# Patient Record
Sex: FEMALE | Race: WHITE | URBAN - METROPOLITAN AREA
[De-identification: names, ages, dates, MRNs, and addresses within clinical notes are randomized per-mention and may not be internally consistent; named-entity substitution may affect disease eponyms.]

---

## 2017-02-20 ENCOUNTER — HOSPITAL ENCOUNTER (OUTPATIENT)
Dept: GENERAL RADIOLOGY | Facility: CLINIC | Age: 42
Discharge: HOME | End: 2017-02-20
Attending: INTERNAL MEDICINE | Admitting: INTERNAL MEDICINE
Payer: COMMERCIAL

## 2017-02-20 DIAGNOSIS — K92.1 HEMATOCHEZIA: ICD-10-CM

## 2017-02-20 DIAGNOSIS — K59.00 CONSTIPATION, UNSPECIFIED CONSTIPATION TYPE: ICD-10-CM

## 2017-02-20 DIAGNOSIS — Z71.3 DIETARY COUNSELING: ICD-10-CM

## 2017-02-20 PROCEDURE — 74000 XR ABDOMEN 1 VW: CPT

## 2017-03-31 ASSESSMENT — MIFFLIN-ST. JEOR: SCORE: 1501.83

## 2017-04-01 ENCOUNTER — ANESTHESIA - HEALTHEAST (OUTPATIENT)
Dept: SURGERY | Facility: HOSPITAL | Age: 42
End: 2017-04-01

## 2017-04-03 ENCOUNTER — SURGERY - HEALTHEAST (OUTPATIENT)
Dept: SURGERY | Facility: HOSPITAL | Age: 42
End: 2017-04-03

## 2017-04-10 ENCOUNTER — TRANSFERRED RECORDS (OUTPATIENT)
Dept: HEALTH INFORMATION MANAGEMENT | Facility: CLINIC | Age: 42
End: 2017-04-10

## 2017-04-11 ENCOUNTER — APPOINTMENT (OUTPATIENT)
Dept: VASCULAR SURGERY | Facility: CLINIC | Age: 42
End: 2017-04-11
Payer: COMMERCIAL

## 2017-04-11 PROCEDURE — 99207 ZZC NO CHARGE LOS: CPT | Performed by: SURGERY

## 2017-04-11 PROCEDURE — 36475 ENDOVENOUS RF 1ST VEIN: CPT | Mod: LT | Performed by: SURGERY

## 2017-04-11 PROCEDURE — 36476 ENDOVENOUS RF VEIN ADD-ON: CPT | Mod: LT | Performed by: SURGERY

## 2017-04-11 PROCEDURE — 37766 PHLEB VEINS - EXTREM 20+: CPT | Mod: LT | Performed by: SURGERY

## 2017-04-12 ENCOUNTER — TELEPHONE (OUTPATIENT)
Dept: OTHER | Facility: CLINIC | Age: 42
End: 2017-04-12

## 2017-04-12 NOTE — TELEPHONE ENCOUNTER
I called Jennifer Crowe Today after [facility]  Yesterday.  She is very comfortable and having no discomfort.  I told her she may remove her  Dressings and shower tonight.  She should reapply the Ace bandage overnight.  She has an appointment to see us in follow-up tomorrow.  She had no further questions or concerns.    Jonathon Phoenix MD

## 2017-04-13 ENCOUNTER — APPOINTMENT (OUTPATIENT)
Dept: VASCULAR SURGERY | Facility: CLINIC | Age: 42
End: 2017-04-13
Payer: COMMERCIAL

## 2017-04-13 PROCEDURE — 93971 EXTREMITY STUDY: CPT | Performed by: SURGERY

## 2017-04-13 PROCEDURE — 99207 ZZC VEINSOLUTIONS POST OPERATIVE VISIT: CPT | Performed by: SURGERY

## 2017-04-19 ENCOUNTER — APPOINTMENT (OUTPATIENT)
Dept: VASCULAR SURGERY | Facility: CLINIC | Age: 42
End: 2017-04-19
Payer: COMMERCIAL

## 2017-04-19 PROCEDURE — 99207 ZZC VEINSOLUTIONS NO CHARGE VISIT: CPT | Performed by: SURGERY

## 2017-06-05 ENCOUNTER — APPOINTMENT (OUTPATIENT)
Dept: VASCULAR SURGERY | Facility: CLINIC | Age: 42
End: 2017-06-05
Payer: COMMERCIAL

## 2017-06-05 PROCEDURE — 99207 ZZC VEINSOLUTIONS POST OPERATIVE VISIT: CPT | Performed by: SURGERY

## 2017-06-17 ENCOUNTER — HEALTH MAINTENANCE LETTER (OUTPATIENT)
Age: 42
End: 2017-06-17

## 2018-08-20 ENCOUNTER — APPOINTMENT (OUTPATIENT)
Dept: VASCULAR SURGERY | Facility: CLINIC | Age: 43
End: 2018-08-20
Payer: COMMERCIAL

## 2018-10-08 ENCOUNTER — SURGERY - HEALTHEAST (OUTPATIENT)
Dept: OBGYN | Facility: HOSPITAL | Age: 43
End: 2018-10-08

## 2018-10-08 ENCOUNTER — ANESTHESIA - HEALTHEAST (OUTPATIENT)
Dept: OBGYN | Facility: HOSPITAL | Age: 43
End: 2018-10-08

## 2018-10-08 ASSESSMENT — MIFFLIN-ST. JEOR: SCORE: 1627.71

## 2018-10-09 ENCOUNTER — RECORDS - HEALTHEAST (OUTPATIENT)
Dept: ADMINISTRATIVE | Facility: OTHER | Age: 43
End: 2018-10-09

## 2019-01-12 ENCOUNTER — NURSE TRIAGE (OUTPATIENT)
Dept: NURSING | Facility: CLINIC | Age: 44
End: 2019-01-12

## 2019-01-13 NOTE — TELEPHONE ENCOUNTER
Pt states she felt cold so she covered up w/ warmer clothing and a blanket. Still felt cold so she took her temp orally. T 94.0 orally. She has no other symptoms. No pain, breathing difficulty, congestion or cough. No exposure to cold environment. She is breastfeeding currently. States she has not had much to eat today. Advised home care. Advised pt eat something now, preferably something warm. Have a warm non-alcoholic beverage. Continue warm clothing; try putting a blanket in the dryer. Check temp of home and turn up if needed.  Call back if above not helpful or if other sx occur. Pt voiced understanding and agreement.  Bertha Leon RN/FNA      Additional Information    Negative: Nursing judgment    Negative: Information only call; adult is not ill or injured    Negative: Nursing judgment    Negative: Nursing judgment    Negative: Nursing judgment    Negative: Nursing judgment    Negative: Nursing judgment    Negative: Nursing judgment    Negative: Nursing judgment    Negative: Nursing judgment    Negative: Nursing judgment    Negative: Nursing judgment    Negative: Nursing judgment    Negative: Nursing judgment    Negative: Nursing judgment    Nursing judgment    Protocols used: NO GUIDELINE OR REFERENCE AVAILABLE-ADULT-

## 2019-03-18 ENCOUNTER — TRANSFERRED RECORDS (OUTPATIENT)
Dept: HEALTH INFORMATION MANAGEMENT | Facility: CLINIC | Age: 44
End: 2019-03-18

## 2019-06-27 ENCOUNTER — TRANSFERRED RECORDS (OUTPATIENT)
Dept: HEALTH INFORMATION MANAGEMENT | Facility: CLINIC | Age: 44
End: 2019-06-27

## 2019-07-03 ENCOUNTER — TRANSFERRED RECORDS (OUTPATIENT)
Dept: HEALTH INFORMATION MANAGEMENT | Facility: CLINIC | Age: 44
End: 2019-07-03

## 2019-07-09 NOTE — TELEPHONE ENCOUNTER
FUTURE VISIT INFORMATION      FUTURE VISIT INFORMATION:    Date: 8/26/19    Time: 8:30 am    Location: [facility] [place] [facility]  REFERRAL INFORMATION:    Referring provider:  ?    Referring providers clinic:  [facility]    Reason for visit/diagnosis  Cyst in nasal cavity    RECORDS REQUESTED FROM:       Clinic name Comments Records Status Imaging Status   [facility] CT Sinus-7/3/19  MRI Head-6/27/19  CT Head-4/10/17  MRI Brain-8/1/19 Epic Received CT Sinus  Rerequested the others                                   Action    Action Taken 7/9/2019 -8:27 AM-Faxed request for imaging and records to [facility].  PB  7/10/19-Received reports for imaging.  Still need images and office notes.   PB  7/1219-Received CT Sinus disc.  Sent to scan into PACS.  PB    8/1/19-Re-sent request for imaging and records.  PB  8/7/2019 -8:20 AM Requested imaging from [place].  PB  8/7/2019 -9:48 AM-Received disc of all images except 8/1/19 MRI Brain from [place]. Sent disc to archive PB  8/13/2019 -10:32 AM-Received 8/1/19 MRI from [place].Sent disc to archive. PB

## 2019-08-26 ENCOUNTER — PRE VISIT (OUTPATIENT)
Dept: OTOLARYNGOLOGY | Facility: CLINIC | Age: 44
End: 2019-08-26

## 2019-08-30 ENCOUNTER — PRE VISIT (OUTPATIENT)
Dept: CARDIOLOGY | Facility: CLINIC | Age: 44
End: 2019-08-30

## 2019-09-09 ENCOUNTER — OFFICE VISIT (OUTPATIENT)
Dept: CARDIOLOGY | Facility: CLINIC | Age: 44
End: 2019-09-09
Payer: COMMERCIAL

## 2019-09-09 VITALS
DIASTOLIC BLOOD PRESSURE: 80 MMHG | BODY MASS INDEX: 23.11 KG/M2 | HEART RATE: 76 BPM | WEIGHT: 156 LBS | SYSTOLIC BLOOD PRESSURE: 108 MMHG | OXYGEN SATURATION: 98 % | HEIGHT: 69 IN

## 2019-09-09 DIAGNOSIS — R00.2 PALPITATIONS: ICD-10-CM

## 2019-09-09 DIAGNOSIS — Z13.6 SCREENING FOR CARDIOVASCULAR CONDITION: Primary | ICD-10-CM

## 2019-09-09 PROCEDURE — 99204 OFFICE O/P NEW MOD 45 MIN: CPT | Performed by: INTERNAL MEDICINE

## 2019-09-09 PROCEDURE — 93000 ELECTROCARDIOGRAM COMPLETE: CPT | Performed by: INTERNAL MEDICINE

## 2019-09-09 RX ORDER — SODIUM PHOSPHATE,MONO-DIBASIC 19G-7G/118
1 ENEMA (ML) RECTAL DAILY
COMMUNITY

## 2019-09-09 RX ORDER — FOLIC ACID 0.8 MG
TABLET ORAL
COMMUNITY

## 2019-09-09 RX ORDER — CHLORAL HYDRATE 500 MG
2 CAPSULE ORAL DAILY
COMMUNITY

## 2019-09-09 ASSESSMENT — MIFFLIN-ST. JEOR: SCORE: 1429.05

## 2019-09-09 NOTE — PROGRESS NOTES
HISTORY OF PRESENT ILLNESS:  Palpitations. More prominent.     Orders this Visit:  Orders Placed This Encounter   Procedures     EKG 12-lead complete w/read - Clinics (performed today)     Orders Placed This Encounter   Medications     Magnesium 500 MG CAPS     glucosamine-chondroitin 500-400 MG CAPS per capsule     Sig: Take 1 capsule by mouth daily     fish oil-omega-3 fatty acids 1000 MG capsule     Sig: Take 2 g by mouth daily     There are no discontinued medications.    Encounter Diagnosis   Name Primary?     Screening for cardiovascular condition Yes       CURRENT MEDICATIONS:  Current Outpatient Medications   Medication Sig Dispense Refill     fish oil-omega-3 fatty acids 1000 MG capsule Take 2 g by mouth daily       glucosamine-chondroitin 500-400 MG CAPS per capsule Take 1 capsule by mouth daily       ibuprofen (ADVIL,MOTRIN) 600 MG tablet Take 1 tablet (600 mg) by mouth every 6 hours as needed for pain (mild) 30 tablet 0     Magnesium 500 MG CAPS        naproxen (NAPROSYN) 500 MG tablet Take 1 tablet (500 mg) by mouth 2 times daily as needed for moderate pain Take BID for 2 weeks then AS NEEDED discomfort 60 tablet 1     Prenatal Vit-Fe Sulfate-FA (PRENATAL VITAMIN OR) Take 1 tablet by mouth daily       valACYclovir (VALTREX) 500 MG tablet Take 500 mg by mouth 2 times daily as needed       ferrous sulfate (IRON) 325 (65 FE) MG tablet Take 1 tablet (325 mg) by mouth 2 times daily (Patient not taking: Reported on 9/9/2019) 60 tablet 2       ALLERGIES     Allergies   Allergen Reactions     Gluten Meal      Per dietary pt mentioned gluten allergy- celiacs      Sulfa Drugs Hives       PAST MEDICAL, SURGICAL, FAMILY, SOCIAL HISTORY:  History was reviewed and updated as needed, see medical record.    Review of Systems:  A 12-point review of systems was completed, see medical record for detailed review of systems information.    Physical Exam:  Vitals: /80 (BP Location: Right arm, Patient Position: Sitting,  "Cuff Size: Adult Regular)   Pulse 76   Ht 1.74 m (5' 8.5\")   Wt 70.8 kg (156 lb)   SpO2 98%   BMI 23.37 kg/m      Constitutional:  cooperative, alert and oriented, well developed, well nourished, in no acute distress        Skin:  warm and dry to the touch, no apparent skin lesions or masses noted        Head:  normocephalic, no masses or lesions        Eyes:  pupils equal and round, conjunctivae and lids unremarkable, sclera white, no xanthalasma, EOMS intact, no nystagmus        ENT:  no pallor or cyanosis, dentition good        Neck:  carotid pulses are full and equal bilaterally, JVP normal, no carotid bruit JVP elevated      Chest:  normal breath sounds, clear to auscultation, normal A-P diameter, normal symmetry, normal respiratory excursion, no use of accessory muscles        Cardiac: regular rhythm, normal S1/S2, no S3 or S4, apical impulse not displaced, no murmurs, gallops or rubs                  Abdomen:  abdomen soft, non-tender, BS normoactive, no mass, no HSM, no bruits        Vascular: pulses full and equal, no bruits auscultated                                      Extremities and Back:  no deformities, clubbing, cyanosis, erythema observed        Neurological:  no gross motor deficits        ASSESSMENT  Likely benign.        RECOMMENDATIONS:   1) TTE  2) ziopatch      Recent Lab Results:  LIPID RESULTS:  No results found for: CHOL, HDL, LDL, TRIG, CHOLHDLRATIO    LIVER ENZYME RESULTS:  No results found for: AST, ALT    CBC RESULTS:  Lab Results   Component Value Date    WBC 9.5 01/02/2016    RBC 2.62 (L) 01/02/2016    HGB 7.6 (L) 01/02/2016    HCT 21.8 (L) 01/02/2016    MCV 83 01/02/2016    MCH 29.0 01/02/2016    MCHC 34.9 01/02/2016    RDW 13.9 01/02/2016     (L) 01/02/2016       BMP RESULTS:  Lab Results   Component Value Date     01/01/2016    POTASSIUM 3.4 01/01/2016    CHLORIDE 103 01/01/2016    CO2 24 01/01/2016    ANIONGAP 10 01/01/2016     (H) 01/01/2016    BUN 9 " 01/01/2016    CR 0.62 01/01/2016    GFRESTIMATED >90  Non  GFR Calc   01/01/2016    GFRESTBLACK >90   GFR Calc   01/01/2016    JANA 7.3 (L) 01/01/2016        A1C RESULTS:  No results found for: A1C    INR RESULTS:  Lab Results   Component Value Date    INR 1.03 01/01/2016       We greatly appreciate the opportunity to be involved in the care of your patient, Jennifer Crowe.    Sincerely,  Alfie Mccord MD      CC  No referring provider defined for this encounter.

## 2019-09-09 NOTE — LETTER
"9/9/2019      Calvin Yost  formerly [facility] [address] [address]      RE: Jennifer Crowe       Dear Colleague,    I had the pleasure of seeing Jennifer Crowe in the [facility].    Service Date: 09/09/2019      HISTORY OF PRESENT ILLNESS:  Jennifer Crowe, a 43-year-old woman, was evaluated in consultation at your request for symptomatic palpitations.      For several months, the patient has been subject to episodes of \"skipped heartbeats\" bothering her at nighttime.  She cannot identify any precipitating factors.  These episodes do not occur during the day or are associated with exercise.  The palpitations were more frequent back in May, but has subsided significantly since then.      The patient has no known history of organic heart disease.  She denies smoking, diabetes mellitus, hypertension, family history of premature coronary disease or previous myocardial infarction.      FAMILY HISTORY:  There is a family history of atrial fibrillation in her grandmother.  There is also history of throat and lung cancer with adrenal disease and celiac disease.      SOCIAL HISTORY:  The patient is .  She has 6 children.  She drinks alcohol, perhaps 1 or 2 times a week at most.      HABITS:  The patient does not abuse tobacco or alcohol.      ALLERGIES:  Sulfa medications.      PAST MEDICAL HISTORY:   1.  Celiac disease.   2.  Cervical radiculopathy.   3.  History of right foot fracture 2010.   4.  History of recurrent miscarriages.      PHYSICAL EXAMINATION:   GENERAL:  Exam today demonstrates a very pleasant, cooperative, soft spoken 43-year-old woman who appears comfortable at rest.   VITAL SIGNS:  Her blood pressure is 108/80, heart rate 76 and regular.  Her height is 1.74 meters, her weight is 70.8 kg.  Her BMI is 23.4.   LUNGS:  Clear to percussion and auscultation.   CARDIOVASCULAR:  Shows a normal S1 with a normal S2.  There is no S3.  " There is no murmur, rub or click.      LABORATORY STUDIES:  Her ECG shows a sinus rhythm with normal axis and normal intervals.  There is poor R-wave progression which is likely due to position of her leads.      The VT segment and QT intervals are normal.      ASSESSMENT:  This 43-year-old woman with no known history of organic heart disease presents with symptomatic palpitations.  Her symptoms appear to be improving without any intervention.      I feel it is very unlikely she has a pathologic or life-threatening rhythm disturbance.  An echocardiogram will be helpful in making certain she has no evidence of structural heart disease.  A ZIO Patch monitor may be helpful in documenting the nature of the rhythm disturbance.      I would be highly unlikely to treat the patient with medication therapy unless there was some significant abnormalities seen on echo and/or a monitor.      RECOMMENDATIONS:   1.  Transthoracic echo.   2.  A 7 day ZIO Patch monitor.      Should either of these tests show significant abnormality, we can discuss whether suppressive medical therapy is helpful.  If not, I would not advise any specific therapy.      We greatly appreciate the opportunity to see your patient, Jennifer Crowe.      cc:   [place] [address]         RADHA CAI MD             D: 2019   T: 2019   MT: CURTIS      Name:     JENNIFER CROWE   MRN:      [id number]        Account:      [id number]   :      1975           Service Date: 2019      Document: [id number]           Outpatient Encounter Medications as of 2019   Medication Sig Dispense Refill     fish oil-omega-3 fatty acids 1000 MG capsule Take 2 g by mouth daily       glucosamine-chondroitin 500-400 MG CAPS per capsule Take 1 capsule by mouth daily       ibuprofen (ADVIL,MOTRIN) 600 MG tablet Take 1 tablet (600 mg) by mouth every 6 hours as needed for  pain (mild) 30 tablet 0     Magnesium 500 MG CAPS        naproxen (NAPROSYN) 500 MG tablet Take 1 tablet (500 mg) by mouth 2 times daily as needed for moderate pain Take BID for 2 weeks then AS NEEDED discomfort 60 tablet 1     Prenatal Vit-Fe Sulfate-FA (PRENATAL VITAMIN OR) Take 1 tablet by mouth daily       valACYclovir (VALTREX) 500 MG tablet Take 500 mg by mouth 2 times daily as needed       ferrous sulfate (IRON) 325 (65 FE) MG tablet Take 1 tablet (325 mg) by mouth 2 times daily (Patient not taking: Reported on 9/9/2019) 60 tablet 2     No facility-administered encounter medications on file as of 9/9/2019.        Again, thank you for allowing me to participate in the care of your patient.      Sincerely,    Alfie Mccord MD     [facility]

## 2019-09-09 NOTE — LETTER
9/9/2019    Calvin Yost  [facility] [address] [address]    RE: Jennifer Crowe       Dear Colleague,    I had the pleasure of seeing Jennifer Crowe in the [facility].    HISTORY OF PRESENT ILLNESS:  Palpitations. More prominent.     Orders this Visit:  Orders Placed This Encounter   Procedures     EKG 12-lead complete w/read - Clinics (performed today)     Orders Placed This Encounter   Medications     Magnesium 500 MG CAPS     glucosamine-chondroitin 500-400 MG CAPS per capsule     Sig: Take 1 capsule by mouth daily     fish oil-omega-3 fatty acids 1000 MG capsule     Sig: Take 2 g by mouth daily     There are no discontinued medications.    Encounter Diagnosis   Name Primary?     Screening for cardiovascular condition Yes       CURRENT MEDICATIONS:  Current Outpatient Medications   Medication Sig Dispense Refill     fish oil-omega-3 fatty acids 1000 MG capsule Take 2 g by mouth daily       glucosamine-chondroitin 500-400 MG CAPS per capsule Take 1 capsule by mouth daily       ibuprofen (ADVIL,MOTRIN) 600 MG tablet Take 1 tablet (600 mg) by mouth every 6 hours as needed for pain (mild) 30 tablet 0     Magnesium 500 MG CAPS        naproxen (NAPROSYN) 500 MG tablet Take 1 tablet (500 mg) by mouth 2 times daily as needed for moderate pain Take BID for 2 weeks then AS NEEDED discomfort 60 tablet 1     Prenatal Vit-Fe Sulfate-FA (PRENATAL VITAMIN OR) Take 1 tablet by mouth daily       valACYclovir (VALTREX) 500 MG tablet Take 500 mg by mouth 2 times daily as needed       ferrous sulfate (IRON) 325 (65 FE) MG tablet Take 1 tablet (325 mg) by mouth 2 times daily (Patient not taking: Reported on 9/9/2019) 60 tablet 2       ALLERGIES     Allergies   Allergen Reactions     Gluten Meal      Per dietary pt mentioned gluten allergy- celiacs      Sulfa Drugs Hives       PAST MEDICAL, SURGICAL, FAMILY, SOCIAL HISTORY:  History was reviewed and updated  "as needed, see medical record.    Review of Systems:  A 12-point review of systems was completed, see medical record for detailed review of systems information.    Physical Exam:  Vitals: /80 (BP Location: Right arm, Patient Position: Sitting, Cuff Size: Adult Regular)   Pulse 76   Ht 1.74 m (5' 8.5\")   Wt 70.8 kg (156 lb)   SpO2 98%   BMI 23.37 kg/m       Constitutional:  cooperative, alert and oriented, well developed, well nourished, in no acute distress        Skin:  warm and dry to the touch, no apparent skin lesions or masses noted        Head:  normocephalic, no masses or lesions        Eyes:  pupils equal and round, conjunctivae and lids unremarkable, sclera white, no xanthalasma, EOMS intact, no nystagmus        ENT:  no pallor or cyanosis, dentition good        Neck:  carotid pulses are full and equal bilaterally, JVP normal, no carotid bruit JVP elevated      Chest:  normal breath sounds, clear to auscultation, normal A-P diameter, normal symmetry, normal respiratory excursion, no use of accessory muscles        Cardiac: regular rhythm, normal S1/S2, no S3 or S4, apical impulse not displaced, no murmurs, gallops or rubs                  Abdomen:  abdomen soft, non-tender, BS normoactive, no mass, no HSM, no bruits        Vascular: pulses full and equal, no bruits auscultated                                      Extremities and Back:  no deformities, clubbing, cyanosis, erythema observed        Neurological:  no gross motor deficits        ASSESSMENT  Likely benign.        RECOMMENDATIONS:   1) TTE  2) ziopatch      Recent Lab Results:  LIPID RESULTS:  No results found for: CHOL, HDL, LDL, TRIG, CHOLHDLRATIO    LIVER ENZYME RESULTS:  No results found for: AST, ALT    CBC RESULTS:  Lab Results   Component Value Date    WBC 9.5 01/02/2016    RBC 2.62 (L) 01/02/2016    HGB 7.6 (L) 01/02/2016    HCT 21.8 (L) 01/02/2016    MCV 83 01/02/2016    MCH 29.0 01/02/2016    MCHC 34.9 01/02/2016    RDW 13.9 " "01/02/2016     (L) 01/02/2016       BMP RESULTS:  Lab Results   Component Value Date     01/01/2016    POTASSIUM 3.4 01/01/2016    CHLORIDE 103 01/01/2016    CO2 24 01/01/2016    ANIONGAP 10 01/01/2016     (H) 01/01/2016    BUN 9 01/01/2016    CR 0.62 01/01/2016    GFRESTIMATED >90  Non  GFR Calc   01/01/2016    GFRESTBLACK >90   GFR Calc   01/01/2016    JANA 7.3 (L) 01/01/2016        A1C RESULTS:  No results found for: A1C    INR RESULTS:  Lab Results   Component Value Date    INR 1.03 01/01/2016       We greatly appreciate the opportunity to be involved in the care of your patient, Jennifer Crowe.    Sincerely,  Alfie Mccord MD      CC  No referring provider defined for this encounter.                                                                       Service Date: 09/09/2019      HISTORY OF PRESENT ILLNESS:  Jennifer Crowe, a 43-year-old woman, was evaluated in consultation at your request for symptomatic palpitations.      For several months, the patient has been subject to episodes of \"skipped heartbeats\" bothering her at nighttime.  She cannot identify any precipitating factors.  These episodes do not occur during the day or are associated with exercise.  The palpitations were more frequent back in May, but has subsided significantly since then.      The patient has no known history of organic heart disease.  She denies smoking, diabetes mellitus, hypertension, family history of premature coronary disease or previous myocardial infarction.      FAMILY HISTORY:  There is a family history of atrial fibrillation in her grandmother.  There is also history of throat and lung cancer with adrenal disease and celiac disease.      SOCIAL HISTORY:  The patient is .  She has 6 children.  She drinks alcohol, perhaps 1 or 2 times a week at most.      HABITS:  The patient does not abuse tobacco or alcohol.      ALLERGIES:  Sulfa medications.    "   PAST MEDICAL HISTORY:   1.  Celiac disease.   2.  Cervical radiculopathy.   3.  History of right foot fracture 2010.   4.  History of recurrent miscarriages.      PHYSICAL EXAMINATION:   GENERAL:  Exam today demonstrates a very pleasant, cooperative, soft spoken 43-year-old woman who appears comfortable at rest.   VITAL SIGNS:  Her blood pressure is 108/80, heart rate 76 and regular.  Her height is 1.74 meters, her weight is 70.8 kg.  Her BMI is 23.4.   LUNGS:  Clear to percussion and auscultation.   CARDIOVASCULAR:  Shows a normal S1 with a normal S2.  There is no S3.  There is no murmur, rub or click.      LABORATORY STUDIES:  Her ECG shows a sinus rhythm with normal axis and normal intervals.  There is poor R-wave progression which is likely due to position of her leads.      The FL segment and QT intervals are normal.      ASSESSMENT:  This 43-year-old woman with no known history of organic heart disease presents with symptomatic palpitations.  Her symptoms appear to be improving without any intervention.      I feel it is very unlikely she has a pathologic or life-threatening rhythm disturbance.  An echocardiogram will be helpful in making certain she has no evidence of structural heart disease.  A ZIO Patch monitor may be helpful in documenting the nature of the rhythm disturbance.      I would be highly unlikely to treat the patient with medication therapy unless there was some significant abnormalities seen on echo and/or a monitor.      RECOMMENDATIONS:   1.  Transthoracic echo.   2.  A 7 day ZIO Patch monitor.      Should either of these tests show significant abnormality, we can discuss whether suppressive medical therapy is helpful.  If not, I would not advise any specific therapy.      We greatly appreciate the opportunity to see your patient, Jennifer Crowe.      cc:   Calvin Yost MD   [address]  [address]         RADHA CAI MD              D: 2019   T: 2019   MT: CURTIS      Name:     JORDYN COLES   MRN:      [id number]        Account:      [id number]   :      1975           Service Date: 2019      Document: [id number]        Thank you for allowing me to participate in the care of your patient.      Sincerely,     Alfie Mccord MD     [facility]    cc:   No referring provider defined for this encounter.

## 2019-09-09 NOTE — PROGRESS NOTES
"Service Date: 09/09/2019      HISTORY OF PRESENT ILLNESS:  Jennifer Crowe, a 43-year-old woman, was evaluated in consultation at your request for symptomatic palpitations.      For several months, the patient has been subject to episodes of \"skipped heartbeats\" bothering her at nighttime.  She cannot identify any precipitating factors.  These episodes do not occur during the day or are associated with exercise.  The palpitations were more frequent back in May, but has subsided significantly since then.      The patient has no known history of organic heart disease.  She denies smoking, diabetes mellitus, hypertension, family history of premature coronary disease or previous myocardial infarction.      FAMILY HISTORY:  There is a family history of atrial fibrillation in her grandmother.  There is also history of throat and lung cancer with adrenal disease and celiac disease.      SOCIAL HISTORY:  The patient is .  She has 6 children.  She drinks alcohol, perhaps 1 or 2 times a week at most.      HABITS:  The patient does not abuse tobacco or alcohol.      ALLERGIES:  Sulfa medications.      PAST MEDICAL HISTORY:   1.  Celiac disease.   2.  Cervical radiculopathy.   3.  History of right foot fracture 2010.   4.  History of recurrent miscarriages.      PHYSICAL EXAMINATION:   GENERAL:  Exam today demonstrates a very pleasant, cooperative, soft spoken 43-year-old woman who appears comfortable at rest.   VITAL SIGNS:  Her blood pressure is 108/80, heart rate 76 and regular.  Her height is 1.74 meters, her weight is 70.8 kg.  Her BMI is 23.4.   LUNGS:  Clear to percussion and auscultation.   CARDIOVASCULAR:  Shows a normal S1 with a normal S2.  There is no S3.  There is no murmur, rub or click.      LABORATORY STUDIES:  Her ECG shows a sinus rhythm with normal axis and normal intervals.  There is poor R-wave progression which is likely due to position of her leads.      The AR segment and QT intervals are " normal.      ASSESSMENT:  This 43-year-old woman with no known history of organic heart disease presents with symptomatic palpitations.  Her symptoms appear to be improving without any intervention.      I feel it is very unlikely she has a pathologic or life-threatening rhythm disturbance.  An echocardiogram will be helpful in making certain she has no evidence of structural heart disease.  A ZIO Patch monitor may be helpful in documenting the nature of the rhythm disturbance.      I would be highly unlikely to treat the patient with medication therapy unless there was some significant abnormalities seen on echo and/or a monitor.      RECOMMENDATIONS:   1.  Transthoracic echo.   2.  A 7 day ZIO Patch monitor.      Should either of these tests show significant abnormality, we can discuss whether suppressive medical therapy is helpful.  If not, I would not advise any specific therapy.      We greatly appreciate the opportunity to see your patient, Jennifer Crowe.      cc:   [place] [address]         RADHA CAI MD             D: 2019   T: 2019   MT: CURTIS      Name:     JENNIFER CROWE   MRN:      [id number]        Account:      [id number]   :      1975           Service Date: 2019      Document: [id number]

## 2019-09-11 ENCOUNTER — DOCUMENTATION ONLY (OUTPATIENT)
Dept: CARDIOLOGY | Facility: CLINIC | Age: 44
End: 2019-09-11

## 2019-09-11 NOTE — PROGRESS NOTES
Pt called stating she hasn't had any palpitations in the last couple days. Last seen by 9/9/19 by  regarding increased palpitations. Echo and 7 day zio patch monitor was ordered. Pt does not want to have the testing done right now given she is not having any sxs. Advised pt to call back if she has sxs or wants to reschedule. JERICHO Zabala

## 2019-10-01 ENCOUNTER — HEALTH MAINTENANCE LETTER (OUTPATIENT)
Age: 44
End: 2019-10-01

## 2019-12-25 ENCOUNTER — ANESTHESIA - HEALTHEAST (OUTPATIENT)
Dept: SURGERY | Facility: HOSPITAL | Age: 44
End: 2019-12-25

## 2019-12-26 ENCOUNTER — SURGERY - HEALTHEAST (OUTPATIENT)
Dept: SURGERY | Facility: HOSPITAL | Age: 44
End: 2019-12-26

## 2020-06-08 ENCOUNTER — TELEPHONE (OUTPATIENT)
Dept: CARDIOLOGY | Facility: CLINIC | Age: 45
End: 2020-06-08

## 2020-06-08 ENCOUNTER — TELEPHONE (OUTPATIENT)
Dept: OTHER | Facility: CLINIC | Age: 45
End: 2020-06-08

## 2020-06-08 DIAGNOSIS — I83.812 VARICOSE VEINS OF LEFT LOWER EXTREMITY WITH PAIN: Primary | ICD-10-CM

## 2020-06-08 NOTE — TELEPHONE ENCOUNTER
Pt called to states that she would like to have previously ordered Echo and Ziopatch completed. Pt saw Dr. Mccord 9/2020 and he recommended pt have Ziopatch and Echo for persistent symptomatic palpitations.     Pt reports she held off on having testing done. She is now requesting to have testing completed as she has had a recurrence of her symptoms with her current pregnancy. Orders extended and pt routed to jim.     LEESA Esquivel RN, BSN.   06/08/20 2:34 PM

## 2020-06-08 NOTE — TELEPHONE ENCOUNTER
Jennifer called to say she saw Dr. Phoenix about 2 years ago.  She is interested in getting more compression stockings.  She is wondering if she has any refills left with Dr. Phoenix.  If not, patient will contact her OB and ask her OB to write a new prescription instead of needing to be seen again. Will route to our  Nurse to contact patient.

## 2020-06-08 NOTE — TELEPHONE ENCOUNTER
Placed new compression hose Rx. Contacted Mariza at [facility] to connect with pt to get refills.     No further action needed at this time.    Vicki Pfeiffer, BSN, RN  [facility]  [employer]

## 2020-07-13 ENCOUNTER — HOSPITAL ENCOUNTER (OUTPATIENT)
Dept: CARDIOLOGY | Facility: CLINIC | Age: 45
Discharge: HOME | End: 2020-07-13
Attending: INTERNAL MEDICINE | Admitting: INTERNAL MEDICINE
Payer: COMMERCIAL

## 2020-07-13 ENCOUNTER — MYC MEDICAL ADVICE (OUTPATIENT)
Dept: CARDIOLOGY | Facility: CLINIC | Age: 45
End: 2020-07-13

## 2020-07-13 DIAGNOSIS — Z13.6 SCREENING FOR CARDIOVASCULAR CONDITION: ICD-10-CM

## 2020-07-13 DIAGNOSIS — R00.2 PALPITATIONS: ICD-10-CM

## 2020-07-13 PROCEDURE — 0298T ZIO PATCH HOLTER ADULT PEDIATRIC GREATER THAN 48 HRS: CPT | Performed by: INTERNAL MEDICINE

## 2020-07-13 PROCEDURE — 93306 TTE W/DOPPLER COMPLETE: CPT | Mod: 26 | Performed by: INTERNAL MEDICINE

## 2020-07-13 PROCEDURE — 93306 TTE W/DOPPLER COMPLETE: CPT

## 2020-07-13 PROCEDURE — 0296T ZIO PATCH HOLTER ADULT PEDIATRIC GREATER THAN 48 HRS: CPT

## 2020-08-03 ENCOUNTER — TELEPHONE (OUTPATIENT)
Dept: CARDIOLOGY | Facility: CLINIC | Age: 45
End: 2020-08-03

## 2020-08-03 NOTE — TELEPHONE ENCOUNTER
Called pt, reviewed Zio patch results and recommendations. No further cardiac testing at this time. Pt to follow up w/ PMD.     ----- Message from Alfie Mccord MD sent at 7/31/2020 10:02 PM CDT -----  Hi Torie  Her Zio Patch shows only rare PACs and rare PVCs.. no good correlation with symptoms. Her rhythm disturbance is benign and I would advise against treatment. Thanks Dr.M Zabala, RN

## 2021-01-15 ENCOUNTER — HEALTH MAINTENANCE LETTER (OUTPATIENT)
Age: 46
End: 2021-01-15

## 2021-05-30 VITALS — WEIGHT: 175.31 LBS | HEIGHT: 68 IN | BODY MASS INDEX: 26.57 KG/M2

## 2021-06-02 VITALS — HEIGHT: 69 IN | WEIGHT: 202 LBS | BODY MASS INDEX: 29.92 KG/M2

## 2021-06-04 VITALS — WEIGHT: 170 LBS | BODY MASS INDEX: 25.47 KG/M2

## 2021-06-04 NOTE — ANESTHESIA PREPROCEDURE EVALUATION
Anesthesia Evaluation      Patient summary reviewed   No history of anesthetic complications     Airway   Mallampati: I  Neck ROM: full   Pulmonary - negative ROS and normal exam    breath sounds clear to auscultation  (-) recent URI                         Cardiovascular - negative ROS and normal exam  Exercise tolerance: > or = 10 METS  (-) angina  Rhythm: regular  Rate: normal,         Neuro/Psych - negative ROS     Endo/Other - negative ROS      GI/Hepatic/Renal    (+) GERD,       Comments: Celiac disease          Dental                           Anesthesia Plan  Planned anesthetic: general LMA  Pre-op: N/A  Intra-op: 20 ketamine with induction, propofol gtt, decadron, zofran  ASA 2   Induction: intravenous   Anesthetic plan and risks discussed with: patient  Anesthesia plan special considerations: antiemetics,   Post-op plan: routine recovery

## 2021-06-04 NOTE — ANESTHESIA POSTPROCEDURE EVALUATION
Patient: Jennifer Crowe  HYSTEROSCOPY, DILATATION AND CURETTAGE  Anesthesia type: general    Patient location: Phase II Recovery  Last vitals:   Vitals Value Taken Time   /80 12/26/2019  9:25 AM   Temp 37.1  C (98.7  F) 12/26/2019  8:49 AM   Pulse 70 12/26/2019  9:25 AM   Resp 16 12/26/2019 10:00 AM   SpO2 98 % 12/26/2019  9:25 AM     Post vital signs: stable  Level of consciousness: awake and responds to simple questions  Post-anesthesia pain: pain controlled  Post-anesthesia nausea and vomiting: no  Pulmonary: unassisted, return to baseline  Cardiovascular: stable and blood pressure at baseline  Hydration: adequate  Anesthetic events: no    QCDR Measures:  ASA# 11 - Kelley-op Cardiac Arrest: ASA11B - Patient did NOT experience unanticipated cardiac arrest  ASA# 12 - Kelley-op Mortality Rate: ASA12B - Patient did NOT die  ASA# 13 - PACU Re-Intubation Rate: ASA13B - Patient did NOT require a new airway mgmt  ASA# 10 - Composite Anes Safety: ASA10A - No serious adverse event    Additional Notes:

## 2021-06-04 NOTE — ANESTHESIA CARE TRANSFER NOTE
Last vitals:   Vitals:    12/26/19 0556   BP: 109/64   Pulse: 75   Resp: 18   Temp: 36.7  C (98  F)   SpO2: 99%     Patient's level of consciousness is awake  Spontaneous respirations: yes  Maintains airway independently: yes  Dentition unchanged: yes  Oropharynx: oropharynx clear of all foreign objects    QCDR Measures:  ASA# 20 - Surgical Safety Checklist: WHO surgical safety checklist completed prior to induction    PQRS# 430 - Adult PONV Prevention: 4558F - Pt received => 2 anti-emetic agents (different classes) preop & intraop  ASA# 8 - Peds PONV Prevention: 4558F - Pt received => 2 anti-emetic agents (different classes) preop & intraop  PQRS# 424 - Kelley-op Temp Management: 4559F - At least one body temp DOCUMENTED => 35.5C or 95.9F within required timeframe  PQRS# 426 - PACU Transfer Protocol: - Transfer of care checklist used  ASA# 14 - Acute Post-op Pain: ASA14B - Patient did NOT experience pain >= 7 out of 10

## 2021-06-09 NOTE — ANESTHESIA PREPROCEDURE EVALUATION
Anesthesia Evaluation      Patient summary reviewed   No history of anesthetic complications     Airway   Mallampati: II  Neck ROM: full   Pulmonary - negative ROS and normal exam                          Cardiovascular - negative ROS and normal exam  Rhythm: regular  Rate: normal,         Neuro/Psych - negative ROS     Endo/Other - negative ROS      GI/Hepatic/Renal - negative ROS           Dental - normal exam                 Chemistry    No results found for: NA, K, CL, CO2, BUN, CREATININE, GLU No results found for: CALCIUM, ALKPHOS, AST, ALT, BILITOT     Lab Results   Component Value Date    HGB 14.0 04/03/2017                Anesthesia Plan  Planned anesthetic: MAC    ASA 2   Induction: intravenous   Anesthetic plan and risks discussed with: patient    Post-op plan: routine recovery

## 2021-06-09 NOTE — ANESTHESIA CARE TRANSFER NOTE
Last vitals:   Vitals:    04/03/17 0800   BP: 94/51   Pulse: 67   Resp: 16   Temp: 36.6  C (97.9  F)   SpO2: 95%     Patient's level of consciousness is drowsy  Spontaneous respirations: yes  Maintains airway independently: yes  Dentition unchanged: yes  Oropharynx: oropharynx clear of all foreign objects    QCDR Measures:  ASA# 20 - Surgical Safety Checklist: ASA20A - Safety Checks Done  PQRS# 430 - Adult PONV Prevention: 4558F - Pt received => 2 anti-emetic agents (different classes) preop & intraop  ASA# 8 - Peds PONV Prevention: NA - Not pediatric patient, not GA or 2 or more risk factors NOT present  PQRS# 424 - Kelley-op Temp Management: 4559F - At least one body temp DOCUMENTED => 35.5C or 95.9F within required timeframe  PQRS# 426 - PACU Transfer Protocol: - Transfer of care checklist used  ASA# 14 - Acute Post-op Pain: ASA14B - Patient did NOT experience pain >= 7 out of 10    I completed my SBAR handoff to the receiving nurse per policy and procedure.

## 2021-06-09 NOTE — ANESTHESIA POSTPROCEDURE EVALUATION
Patient: Jennifer Crowe  SUCTION CURETTAGE  Anesthesia type: MAC    Patient location: Phase II Recovery  Last vitals:   Vitals:    04/03/17 0830   BP: 103/60   Pulse: (!) 55   Resp: 16   Temp:    SpO2: 99%     Post vital signs: stable  Level of consciousness: awake and responds to simple questions  Post-anesthesia pain: pain controlled  Post-anesthesia nausea and vomiting: no  Pulmonary: unassisted, return to baseline  Cardiovascular: stable and blood pressure at baseline  Hydration: adequate  Anesthetic events: no    QCDR Measures:  ASA# 11 - Kelley-op Cardiac Arrest: ASA11B - Patient did NOT experience unanticipated cardiac arrest  ASA# 12 - Kelley-op Mortality Rate: ASA12B - Patient did NOT die  ASA# 13 - PACU Re-Intubation Rate: NA - No ETT / LMA used for case  ASA# 10 - Composite Anes Safety: ASA10A - No serious adverse event  ASA# 38 - New Corneal Injury: ASA38A - No new exposure keratitis or corneal abrasion in PACU    Additional Notes:

## 2021-06-20 NOTE — ANESTHESIA POSTPROCEDURE EVALUATION
Patient: Jennifer Crowe   SECTION  Anesthesia type: general    Patient location: PACU  Last vitals:   Vitals:    10/09/18 0015   BP: 126/72   Pulse: 68   Resp: 16   Temp:    SpO2: 97%     Post vital signs: stable  Level of consciousness: awake and responds to simple questions  Post-anesthesia pain: pain controlled  Post-anesthesia nausea and vomiting: no  Pulmonary: unassisted, return to baseline  Cardiovascular: stable and blood pressure at baseline  Hydration: adequate  Anesthetic events: no    QCDR Measures:  ASA# 11 - Kelley-op Cardiac Arrest: ASA11B - Patient did NOT experience unanticipated cardiac arrest  ASA# 12 - Kelley-op Mortality Rate: ASA12B - Patient did NOT die  ASA# 13 - PACU Re-Intubation Rate: ASA13B - Patient did NOT require a new airway mgmt  ASA# 10 - Composite Anes Safety: ASA10A - No serious adverse event    Additional Notes:

## 2021-06-20 NOTE — ANESTHESIA CARE TRANSFER NOTE
Last vitals:   Vitals:    10/08/18 2345   BP: 137/89   Pulse: 72   Resp: 16   Temp: (!) 35.6  C (96.1  F)   SpO2: 100%     Patient's level of consciousness is drowsy  Spontaneous respirations: yes  Maintains airway independently: yes  Dentition unchanged: yes  Oropharynx: oropharynx clear of all foreign objects    QCDR Measures:  ASA# 20 - Surgical Safety Checklist: WHO surgical safety checklist completed prior to induction  PQRS# 430 - Adult PONV Prevention: 4558F - Pt received => 2 anti-emetic agents (different classes) preop & intraop  ASA# 8 - Peds PONV Prevention: NA - Not pediatric patient, not GA or 2 or more risk factors NOT present  PQRS# 424 - Kelley-op Temp Management: 4559F - At least one body temp DOCUMENTED => 35.5C or 95.9F within required timeframe  PQRS# 426 - PACU Transfer Protocol: - Transfer of care checklist used  ASA# 14 - Acute Post-op Pain: ASA14B - Patient did NOT experience pain >= 7 out of 10

## 2021-06-20 NOTE — ANESTHESIA PREPROCEDURE EVALUATION
Anesthesia Evaluation      Patient summary reviewed   No history of anesthetic complications     Airway   Mallampati: II   Pulmonary - negative ROS                          Cardiovascular - negative ROS   Neuro/Psych - negative ROS     Endo/Other - negative ROS   (+) pregnant     GI/Hepatic/Renal    (+) GERD,        Other findings: STAT called as preparing another pt for a CS        Dental                         Anesthesia Plan  Planned anesthetic: general endotracheal  +/- TAP if surgeon requests  ASA 2 - emergent   Induction: intravenous   Anesthetic plan and risks discussed with: patient  Anesthesia plan special considerations: antiemetics,

## 2021-06-20 NOTE — ANESTHESIA PROCEDURE NOTES
Peripheral Block    Patient location during procedure: OR  Start time: 10/8/2018 11:05 PM  End time: 10/9/2018 11:12 PM  post-op analgesia per surgeon order as noted in medical record  Staffing:  Performing  Anesthesiologist: ZINA MITCHELL  Performing CRNA: JACK VASQUEZ  Preanesthetic Checklist  Completed: patient identified, site marked, risks, benefits, and alternatives discussed, timeout performed, consent obtained, airway assessed, oxygen available, suction available, emergency drugs available and hand hygiene performed  Peripheral Block  Block type: other, TAP  Prep: ChloraPrep  Patient position: supine  Patient monitoring: cardiac monitor, heart rate, blood pressure and continuous pulse oximetry  Laterality: bilateral  Injection technique: ultrasound guided    Ultrasound used to visualize needle placement in proximity to nerve being blocked: yes   Permanent ultrasound image captured for medical record  Sterile gel and probe cover used for ultrasound.    Needle  Needle type: echogenic   Needle gauge: 22 G  Needle length: 4 in    Assessment  Injection assessment: no difficulty with injection, negative aspiration for heme, no paresthesia on injection and incremental injection

## 2021-07-14 PROBLEM — Z34.90 PREGNANT: Status: RESOLVED | Noted: 2018-10-08 | Resolved: 2018-10-12

## 2021-09-04 ENCOUNTER — HEALTH MAINTENANCE LETTER (OUTPATIENT)
Age: 46
End: 2021-09-04

## 2022-02-13 ENCOUNTER — HEALTH MAINTENANCE LETTER (OUTPATIENT)
Age: 47
End: 2022-02-13

## 2022-10-16 ENCOUNTER — HEALTH MAINTENANCE LETTER (OUTPATIENT)
Age: 47
End: 2022-10-16

## 2023-03-26 ENCOUNTER — HEALTH MAINTENANCE LETTER (OUTPATIENT)
Age: 48
End: 2023-03-26